# Patient Record
Sex: FEMALE | URBAN - METROPOLITAN AREA
[De-identification: names, ages, dates, MRNs, and addresses within clinical notes are randomized per-mention and may not be internally consistent; named-entity substitution may affect disease eponyms.]

---

## 2020-10-06 ENCOUNTER — OFFICE VISIT (OUTPATIENT)
Dept: URBAN - METROPOLITAN AREA CLINIC 13 | Facility: CLINIC | Age: 65
End: 2020-10-06
Payer: COMMERCIAL

## 2020-10-06 DIAGNOSIS — H43.823 VITREOMACULAR ADHESION, BILATERAL: ICD-10-CM

## 2020-10-06 DIAGNOSIS — E11.3513 TYPE 2 DIAB WITH PROLIF DIAB RTNOP WITH MACULAR EDEMA, BI: Primary | ICD-10-CM

## 2020-10-06 PROCEDURE — 67028 INJECTION EYE DRUG: CPT | Performed by: OPHTHALMOLOGY

## 2020-10-06 PROCEDURE — 92014 COMPRE OPH EXAM EST PT 1/>: CPT | Performed by: OPHTHALMOLOGY

## 2020-10-06 PROCEDURE — 92134 CPTRZ OPH DX IMG PST SGM RTA: CPT | Performed by: OPHTHALMOLOGY

## 2020-10-06 ASSESSMENT — INTRAOCULAR PRESSURE
OS: 21
OD: 18

## 2020-10-06 NOTE — IMPRESSION/PLAN
Impression: Type 2 diab with prolif diab rtnop with macular edema, bi: [id number]. OU. Status: Symptomatic.
s/p Avastin OU last 06/04/19
h/o sparse PRP OU, and antiVEGF injections ([address] [place] [address]) New VH OS on 3/2010 after being off of injections Plan: Addendum done 12/14/20 to correct the correct eye that was injected, OD was entered by mistake [facility]) Exam and OCT demonstrate PDR and DME w/ no recurrence of VH after Avastin OS 12 wks ago. She has not had any recurrent episodes after restarting on AntiVEGF. Recommend Avastin today and maintain next visit to 12 wks. R/B/A/s given. Discussed findings with patient. Patient elects to proceed. Intravitreal Avastin injection performed in office today OS without complications. 

RTC: 12 weeks, re-eval OCT OU possible Avastin

## 2020-10-06 NOTE — IMPRESSION/PLAN
Impression: Vitreomacular adhesion, bilateral: [id number]. Plan: Patient notes declining vision. Exam and OCT demonstrates VMT OU along with ERM OU. Discussed that these sometimes worsen with antiVEGF treatment. May require PPV in the future. Continue observation for now.

## 2020-10-06 NOTE — IMPRESSION/PLAN
Impression: Vitreous hemorrhage, left eye: H43.12. Plan: Exam confirms resolving vitreous hemorrhage in the left eye, secondary to PDR. Discussed R/B/A's of observation vs. PPV vs. Anti-VEGF injection. Recommend antiVEGF tx as above. Patient to sleep with elevation. May consider PPV if does not resolve on its own.

## 2020-10-19 ENCOUNTER — OFFICE VISIT (OUTPATIENT)
Dept: URBAN - METROPOLITAN AREA CLINIC 7 | Facility: CLINIC | Age: 65
End: 2020-10-19
Payer: COMMERCIAL

## 2020-10-19 DIAGNOSIS — H43.12 VITREOUS HEMORRHAGE, LEFT EYE: ICD-10-CM

## 2020-10-19 PROCEDURE — 92014 COMPRE OPH EXAM EST PT 1/>: CPT | Performed by: OPHTHALMOLOGY

## 2020-10-19 PROCEDURE — 92134 CPTRZ OPH DX IMG PST SGM RTA: CPT | Performed by: OPHTHALMOLOGY

## 2020-10-19 ASSESSMENT — INTRAOCULAR PRESSURE
OD: 12
OS: 14

## 2020-10-19 NOTE — IMPRESSION/PLAN
Impression: Vitreomacular adhesion, bilateral: [id number]. Plan: Patient notes declining vision. Exam and OCT demonstrates VMT OU along with ERM OU. Discussed that these sometimes worsen with antiVEGF treatment. May require PPV in the future. Continue observation for now.

## 2020-10-19 NOTE — IMPRESSION/PLAN
Impression: Type 2 diab with prolif diab rtnop with macular edema, bi: [id number]. OU. Status: Symptomatic. OCT OU= No View OD, OS, CMT 
s/p Avastin OD  06/04/19
s/p Avastin OS 10/06/202
h/o sparse PRP OU, and antiVEGF injections ([address] [place] [address]) New VH OS on 3/2010 after being off of injections Plan: OD: new onset VH with large central subhyaloid component  = please see below OS: stable with q12 week Avastin with SI = CSM with SI Would rec AVastin OD. No periph traction seen. Given the amount of heme present, suspect she would benefit from PPV/EL. Discussed R,B,A of Avastin vs Lucentis vs Eylea vs Beovu injection. Discussed no FDA approval with Avastin and compounding risk. Discussed signs and symptoms of inflammation, endophthalmitis, vitreous hemorrhage, retinal tear, retinal detachment. Patient understands and wishes to proceed with Avastin injection today. Timeout was performed before procedure. AVASTIN INJECTION COMPLETED TODAY as per protocol without complications. 

2 week OCT Ou re-eval with SI

## 2020-11-03 ENCOUNTER — OFFICE VISIT (OUTPATIENT)
Dept: URBAN - METROPOLITAN AREA CLINIC 13 | Facility: CLINIC | Age: 65
End: 2020-11-03
Payer: COMMERCIAL

## 2020-11-03 DIAGNOSIS — Z96.1 PRESENCE OF INTRAOCULAR LENS: ICD-10-CM

## 2020-11-03 PROCEDURE — 92134 CPTRZ OPH DX IMG PST SGM RTA: CPT | Performed by: OPHTHALMOLOGY

## 2020-11-03 PROCEDURE — 99213 OFFICE O/P EST LOW 20 MIN: CPT | Performed by: OPHTHALMOLOGY

## 2020-11-03 RX ORDER — OFLOXACIN 3 MG/ML
0.3 % SOLUTION/ DROPS OPHTHALMIC
Qty: 5 | Refills: 0 | Status: INACTIVE
Start: 2020-11-03 | End: 2020-12-02

## 2020-11-03 RX ORDER — PREDNISOLONE ACETATE 10 MG/ML
1 % SUSPENSION/ DROPS OPHTHALMIC
Qty: 5 | Refills: 3 | Status: INACTIVE
Start: 2020-11-03 | End: 2020-12-02

## 2020-11-03 ASSESSMENT — INTRAOCULAR PRESSURE
OD: 13
OS: 12

## 2020-11-03 NOTE — IMPRESSION/PLAN
Impression: Type 2 diab with prolif diab rtnop with macular edema, bi: [id number]. OU. Status: Symptomatic. OCT OU= No View OD, VMT 
s/p Avastin OD  10/19/20
s/p Avastin OS 10/06/2020
h/o sparse PRP OU, and antiVEGF injections ([address] [place] [address]) New VH OS on 3/2010 after being off of injections Plan: OD: new onset VH with large central subhyaloid component  = please see below OS: stable with q12 week Avastin (last 10/6/20) = CSM & obs for now No improvement with Avastin and obs OD. Rec PPV/EL. There is a non-clearing vitreous hemorrhage (VH). Despite adequate time, the [facility] has failed to clear on its own. We discussed the natural history of the disease and the risks, benefits, indications, and alternatives to vitrectomy with laser. The risks of vitrectomy include but are not limited to infection, retinal tear or detachment, glaucoma, cataract formation in a phakic patient, hemorrhage, loss of eye and blindness, recurrent VH, and need for additional surgery. The patient understands that some important surgical tasks may be performed by a fellow under my direct supervision, and consents to such. The patient elects to proceed with vitrectomy surgery.  

PLAN: 27g PPV/EL/poss gas x VH OD (SI)

## 2020-11-03 NOTE — IMPRESSION/PLAN
Impression: Vitreomacular adhesion, bilateral: [id number]. Plan: Patient notes declining vision. Exam and OCT demonstrates VMT OU along with ERM OU. Discussed that these sometimes worsen with antiVEGF treatment. May require PPV in the future. Continue observation for now.

## 2020-11-16 ENCOUNTER — Encounter (OUTPATIENT)
Dept: URBAN - METROPOLITAN AREA EXTERNAL CLINIC 14 | Facility: EXTERNAL CLINIC | Age: 65
End: 2020-11-16
Payer: COMMERCIAL

## 2020-11-16 PROCEDURE — 67113 REPAIR RETINAL DETACH CPLX: CPT | Performed by: OPHTHALMOLOGY

## 2020-11-17 ENCOUNTER — POST-OPERATIVE VISIT (OUTPATIENT)
Dept: URBAN - METROPOLITAN AREA CLINIC 13 | Facility: CLINIC | Age: 65
End: 2020-11-17
Payer: COMMERCIAL

## 2020-11-17 PROCEDURE — 99024 POSTOP FOLLOW-UP VISIT: CPT | Performed by: OPHTHALMOLOGY

## 2020-11-17 PROCEDURE — 65810 DRAINAGE OF EYE: CPT | Performed by: OPHTHALMOLOGY

## 2020-11-17 RX ORDER — BRIMONIDINE TARTRATE 2 MG/ML
0.2 % SOLUTION/ DROPS OPHTHALMIC
Qty: 5 | Refills: 3 | Status: INACTIVE
Start: 2020-11-17 | End: 2020-11-18

## 2020-11-17 RX ORDER — DORZOLAMIDE HYDROCHLORIDE AND TIMOLOL MALEATE 20; 5 MG/ML; MG/ML
SOLUTION/ DROPS OPHTHALMIC
Qty: 5 | Refills: 3 | Status: INACTIVE
Start: 2020-11-17 | End: 2020-11-18

## 2020-11-17 RX ORDER — BRIMONIDINE TARTRATE 1 MG/ML
0.1 % SOLUTION/ DROPS OPHTHALMIC
Qty: 5 | Refills: 3 | Status: INACTIVE
Start: 2020-11-17 | End: 2021-02-01

## 2020-11-17 ASSESSMENT — INTRAOCULAR PRESSURE
OS: 13
OD: 36

## 2020-11-19 ENCOUNTER — POST-OPERATIVE VISIT (OUTPATIENT)
Dept: URBAN - METROPOLITAN AREA CLINIC 13 | Facility: CLINIC | Age: 65
End: 2020-11-19
Payer: COMMERCIAL

## 2020-11-19 PROCEDURE — 99024 POSTOP FOLLOW-UP VISIT: CPT | Performed by: OPHTHALMOLOGY

## 2020-11-19 ASSESSMENT — INTRAOCULAR PRESSURE
OS: 14
OD: 27

## 2020-11-19 NOTE — IMPRESSION/PLAN
Impression: S/P 27g PPV/EL/poss gas x VH OD - 3 Days. Vitreous hemorrhage, right eye  H43.11. Excellent post op course. Post operative instructions reviewed. Condition is improving. Hyphema significantly improved Plan: Patient did not  her IPO drops. sample of combigan given.   
RTC Tuesday POS

## 2020-11-24 ENCOUNTER — POST-OPERATIVE VISIT (OUTPATIENT)
Dept: URBAN - METROPOLITAN AREA CLINIC 13 | Facility: CLINIC | Age: 65
End: 2020-11-24
Payer: COMMERCIAL

## 2020-11-24 PROCEDURE — 99024 POSTOP FOLLOW-UP VISIT: CPT | Performed by: OPHTHALMOLOGY

## 2020-11-24 ASSESSMENT — INTRAOCULAR PRESSURE
OD: 31
OS: 16

## 2020-11-26 ENCOUNTER — POST-OPERATIVE VISIT (OUTPATIENT)
Dept: URBAN - METROPOLITAN AREA CLINIC 7 | Facility: CLINIC | Age: 65
End: 2020-11-26
Payer: COMMERCIAL

## 2020-11-26 PROCEDURE — 99024 POSTOP FOLLOW-UP VISIT: CPT | Performed by: OPHTHALMOLOGY

## 2020-11-26 ASSESSMENT — INTRAOCULAR PRESSURE
OD: 26
OS: 15

## 2020-11-26 NOTE — IMPRESSION/PLAN
Impression: S/P 27g PPV/EL/ gas x VH OD - 10 Days. 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  Excellent post op course   Post operative instructions reviewed - Condition is improving - Plan: RTC: Tuesday [address]

## 2020-12-01 ENCOUNTER — POST-OPERATIVE VISIT (OUTPATIENT)
Dept: URBAN - METROPOLITAN AREA CLINIC 13 | Facility: CLINIC | Age: 65
End: 2020-12-01
Payer: COMMERCIAL

## 2020-12-01 PROCEDURE — 99024 POSTOP FOLLOW-UP VISIT: CPT | Performed by: OPHTHALMOLOGY

## 2020-12-01 PROCEDURE — 65810 DRAINAGE OF EYE: CPT | Performed by: OPHTHALMOLOGY

## 2020-12-01 ASSESSMENT — INTRAOCULAR PRESSURE
OD: 28
OS: 13

## 2020-12-01 NOTE — IMPRESSION/PLAN
Impression: 1) S/P 27g PPV, EL, gas x VH OD - 15 Days. 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 2) Hyphema 3) Secondary glaucoma Plan: 1) Doing well, retina attached
2, 3) Hyphema still present; IOP remains elevated. Recommend AC tap today. Continue drops.   

RTC: 23g PPV/AC washout/poss gas/Avastin x VH/hyphema/secondary glaucoma

## 2020-12-07 ENCOUNTER — Encounter (OUTPATIENT)
Dept: URBAN - METROPOLITAN AREA EXTERNAL CLINIC 14 | Facility: EXTERNAL CLINIC | Age: 65
End: 2020-12-07
Payer: COMMERCIAL

## 2020-12-07 PROCEDURE — 67041 VIT FOR MACULAR PUCKER: CPT | Performed by: OPHTHALMOLOGY

## 2020-12-08 ENCOUNTER — POST-OPERATIVE VISIT (OUTPATIENT)
Dept: URBAN - METROPOLITAN AREA CLINIC 13 | Facility: CLINIC | Age: 65
End: 2020-12-08
Payer: COMMERCIAL

## 2020-12-08 DIAGNOSIS — H43.11 VITREOUS HEMORRHAGE, RIGHT EYE: Primary | ICD-10-CM

## 2020-12-08 PROCEDURE — 99024 POSTOP FOLLOW-UP VISIT: CPT | Performed by: OPHTHALMOLOGY

## 2020-12-08 ASSESSMENT — INTRAOCULAR PRESSURE
OS: 12
OD: 14

## 2020-12-08 NOTE — IMPRESSION/PLAN
Impression: S/P 23g PPV/AC washout/poss gas/Avastin OD - 1 Day. Vitreous hemorrhage, right eye  H43.11.  Excellent post op course   Post operative instructions reviewed - Condition is improving - Plan: RTC: 1 wk POS

## 2020-12-17 ENCOUNTER — POST-OPERATIVE VISIT (OUTPATIENT)
Dept: URBAN - METROPOLITAN AREA CLINIC 13 | Facility: CLINIC | Age: 65
End: 2020-12-17
Payer: COMMERCIAL

## 2020-12-17 PROCEDURE — 99024 POSTOP FOLLOW-UP VISIT: CPT | Performed by: OPHTHALMOLOGY

## 2020-12-17 ASSESSMENT — INTRAOCULAR PRESSURE
OD: 25
OS: 14

## 2020-12-17 NOTE — IMPRESSION/PLAN
Impression: S/P  23g PPV/AC washout/poss gas/Avastin OD - 10 Days. Vitreous hemorrhage, right eye  H43.11. Excellent post op course   Post operative instructions reviewed - Condition is improving - Plan: Doing well. 1+ Dfolds.  Cont PF TID, Combigan BID
RTC: 3 wks POS OCTOU

## 2021-01-06 ENCOUNTER — POST-OPERATIVE VISIT (OUTPATIENT)
Dept: URBAN - METROPOLITAN AREA CLINIC 13 | Facility: CLINIC | Age: 66
End: 2021-01-06
Payer: COMMERCIAL

## 2021-01-06 PROCEDURE — 92134 CPTRZ OPH DX IMG PST SGM RTA: CPT | Performed by: OPHTHALMOLOGY

## 2021-01-06 PROCEDURE — 99024 POSTOP FOLLOW-UP VISIT: CPT | Performed by: OPHTHALMOLOGY

## 2021-01-06 ASSESSMENT — INTRAOCULAR PRESSURE
OD: 13
OS: 14

## 2021-01-06 NOTE — IMPRESSION/PLAN
Impression: S/P 23g PPV/AC washout/poss gas/Avastin OD - 30 Days. Vitreous hemorrhage, right eye  H43.11.  Excellent post op course   Post operative instructions reviewed - Condition is improving - Plan: RTC 3 mo with OCT OU, Optos FA OS>OD, Ozurdex OU

## 2021-03-31 ENCOUNTER — OFFICE VISIT (OUTPATIENT)
Dept: URBAN - METROPOLITAN AREA CLINIC 13 | Facility: CLINIC | Age: 66
End: 2021-03-31
Payer: COMMERCIAL

## 2021-03-31 DIAGNOSIS — E11.3533 TYPE 2 DIABETES MELLITUS WITH PROLIFERATIVE DIABETIC RETINOPATHY WITH TRACTION RETINAL DETACHMENT NOT INVOLVING THE MACULA, BILATERAL: ICD-10-CM

## 2021-03-31 PROCEDURE — 92012 INTRM OPH EXAM EST PATIENT: CPT | Performed by: OPHTHALMOLOGY

## 2021-03-31 PROCEDURE — 92134 CPTRZ OPH DX IMG PST SGM RTA: CPT | Performed by: OPHTHALMOLOGY

## 2021-03-31 PROCEDURE — 92235 FLUORESCEIN ANGRPH MLTIFRAME: CPT | Performed by: OPHTHALMOLOGY

## 2021-03-31 ASSESSMENT — INTRAOCULAR PRESSURE
OD: 16
OS: 12

## 2021-03-31 NOTE — IMPRESSION/PLAN
Impression: -S/P 23g PPV, MP, EL, gas x VH OD Plan: OD:  Doing well, retina attached after PPV. Recommend Ozurdex OD per priorSee plan for PDR with DME
OS: + VMT and FVP.   Recommend Avastin OS

## 2021-03-31 NOTE — IMPRESSION/PLAN
Impression: Vitreous hemorrhage, right eye  H43.11.
-s/p 23g PPV/AC washout/poss gas/Avastin OD (12/07/2020)-SI Plan: RTC 3 mo with OCT OU, Optos FA OS>OD, Ozurdex OU

## 2021-03-31 NOTE — IMPRESSION/PLAN
Impression: Type 2 diab with prolif diab rtnop with macular edema, bi: [id number].
- h/o Avastin injections OU Plan: Exam and OCT demonstrate DME OD>>OS. Optos FA demonstrate NVE OS>OD. Recommend intravitreal Ozurdex OD today and Avastin OS. R/B/A discussed and patient elects to proceed. Intravitreal Ozurdex injected OD today without complication Intravitreal Avastin injected OS today without complication. 

RTC 1 mo with OCT OU, poss Avastin OU

## 2021-05-25 ENCOUNTER — OFFICE VISIT (OUTPATIENT)
Dept: URBAN - METROPOLITAN AREA CLINIC 13 | Facility: CLINIC | Age: 66
End: 2021-05-25
Payer: COMMERCIAL

## 2021-05-25 DIAGNOSIS — H43.822 VITREOMACULAR ADHESION, LEFT EYE: ICD-10-CM

## 2021-05-25 PROCEDURE — 92012 INTRM OPH EXAM EST PATIENT: CPT | Performed by: OPHTHALMOLOGY

## 2021-05-25 PROCEDURE — 92134 CPTRZ OPH DX IMG PST SGM RTA: CPT | Performed by: OPHTHALMOLOGY

## 2021-05-25 ASSESSMENT — INTRAOCULAR PRESSURE
OD: 17
OS: 21

## 2021-05-25 NOTE — IMPRESSION/PLAN
Impression: Type 2 diabetes mellitus with proliferative diabetic retinopathy with traction retinal detachment involving the macula, bilateral
-S/P 23g PPV, MP, EL, gas x VH OD Plan: OD:  Retina attached after PPV. ERM and fibrosis appears worse. Recommend Avastin OD. May consider repeat PPV if fibrosis worsens. OS: + VMT and FVP. Recommend Avastin OS.   Will follow closely

## 2021-05-25 NOTE — IMPRESSION/PLAN
Impression: Type 2 diab with prolif diab rtnop with macular edema, bi: [id number].
- h/o Avastin injections OU
-s/p Ozurdex OD last 03/31/2021
-s/p Avastin OS last 03/31/2021 Plan: Exam and OCT demonstrate DME OD>>OS. Optos FA demonstrate NVE OS>OD. Recommend intravitreal Avastin OD today and Avastin OS. R/B/A discussed and patient elects to proceed. Intravitreal Avastin injected OD today without complication Intravitreal Avastin injected OS today without complication. 

RTC 2 mo with OCT OU, poss Avastin OU

## 2021-05-25 NOTE — IMPRESSION/PLAN
Impression: Vitreous hemorrhage, right eye  H43.11.
-s/p 23g PPV/AC washout/poss gas/Avastin OD (12/07/2020)-SI Plan: No recurrence. Observe for now.
